# Patient Record
Sex: FEMALE | Race: WHITE | HISPANIC OR LATINO | URBAN - METROPOLITAN AREA
[De-identification: names, ages, dates, MRNs, and addresses within clinical notes are randomized per-mention and may not be internally consistent; named-entity substitution may affect disease eponyms.]

---

## 2021-06-21 ENCOUNTER — OFFICE VISIT (OUTPATIENT)
Dept: URBAN - METROPOLITAN AREA CLINIC 78 | Facility: CLINIC | Age: 36
End: 2021-06-21
Payer: COMMERCIAL

## 2021-06-21 DIAGNOSIS — R12 HEARTBURN: ICD-10-CM

## 2021-06-21 DIAGNOSIS — Z80.0 FAMILY HISTORY OF STOMACH CANCER: ICD-10-CM

## 2021-06-21 DIAGNOSIS — Z86.19 H/O HELICOBACTER INFECTION: ICD-10-CM

## 2021-06-21 DIAGNOSIS — R11.0 NAUSEA: ICD-10-CM

## 2021-06-21 PROCEDURE — 99244 OFF/OP CNSLTJ NEW/EST MOD 40: CPT | Performed by: INTERNAL MEDICINE

## 2021-06-21 RX ORDER — OMEPRAZOLE 40 MG/1
1 CAPSULE 30 MINUTES BEFORE MORNING MEAL CAPSULE, DELAYED RELEASE ORAL ONCE A DAY
Qty: 30 | Refills: 1 | OUTPATIENT
Start: 2021-06-21

## 2021-06-21 NOTE — HPI-TODAY'S VISIT:
The patient was referred to us from [facility] where she sees Dr. Viry Davison. A copy of this note will be sent to the referring physician.   The patient states that ever since she had COVID in January 2021, she had had a lot of new onset GI issues. She describes symtoms of heartburn occurring both during the daytime and nigthtime as well as morning nausea without vomiting. She denies dysphagia.   She was treated for H pylori infection in January of 2021.  She had a follow up H pylori breath test after completing antibiotic therapy and this was negative, as per the patient.  There is no recent history of rectal bleeding. The patient has no pertinent additional complaints of  diarrhea, anorexia or unintentional weight loss.   She has been struggling  with constipation. She has increased the fiber in her diet and does feel that when she eats granola or oatmeal she has a good sensation of evacuation.   She has had some intermittent abdominal pain, described as 7/10 in intensity, worse when she gets bloated.   The patient does not take blood thinners.  She just a tubal ligation reversal and was given some meds for nausea, which she willow used as needed.  The patient has never had either EGD or colonoscopy previously. There is no FH of colon cancer or colon polyps. Her mother was diagnosed with stomach cancer at age 49.

## 2021-07-09 ENCOUNTER — OFFICE VISIT (OUTPATIENT)
Dept: URBAN - METROPOLITAN AREA LAB 1 | Facility: LAB | Age: 36
End: 2021-07-09
Payer: COMMERCIAL

## 2021-07-09 DIAGNOSIS — R11.0 CHRONIC NAUSEA: ICD-10-CM

## 2021-07-09 DIAGNOSIS — K22.8 COLUMNAR-LINED ESOPHAGUS: ICD-10-CM

## 2021-07-09 DIAGNOSIS — K29.60 ADENOPAPILLOMATOSIS GASTRICA: ICD-10-CM

## 2021-07-09 DIAGNOSIS — R12 BURNING REFLUX: ICD-10-CM

## 2021-07-09 PROCEDURE — 43239 EGD BIOPSY SINGLE/MULTIPLE: CPT | Performed by: INTERNAL MEDICINE

## 2021-07-09 RX ORDER — OMEPRAZOLE 40 MG/1
1 CAPSULE 30 MINUTES BEFORE MORNING MEAL CAPSULE, DELAYED RELEASE ORAL ONCE A DAY
Qty: 30 | Refills: 1 | Status: ACTIVE | COMMUNITY
Start: 2021-06-21

## 2021-07-16 ENCOUNTER — ERX REFILL RESPONSE (OUTPATIENT)
Dept: URBAN - METROPOLITAN AREA CLINIC 78 | Facility: CLINIC | Age: 36
End: 2021-07-16

## 2021-07-16 RX ORDER — OMEPRAZOLE 40 MG/1
1 CAPSULE 30 MINUTES BEFORE MORNING MEAL ONCE A DAY ORALLY 30 DAY(S) CAPSULE, DELAYED RELEASE ORAL
Qty: 30 CAPSULE | Refills: 2 | OUTPATIENT

## 2021-07-16 RX ORDER — OMEPRAZOLE 40 MG/1
1 CAPSULE 30 MINUTES BEFORE MORNING MEAL CAPSULE, DELAYED RELEASE ORAL ONCE A DAY
Qty: 30 | Refills: 1 | OUTPATIENT

## 2021-09-17 ENCOUNTER — DASHBOARD ENCOUNTERS (OUTPATIENT)
Age: 36
End: 2021-09-17

## 2021-09-17 ENCOUNTER — OFFICE VISIT (OUTPATIENT)
Dept: URBAN - METROPOLITAN AREA CLINIC 78 | Facility: CLINIC | Age: 36
End: 2021-09-17
Payer: COMMERCIAL

## 2021-09-17 VITALS
WEIGHT: 152.4 LBS | HEART RATE: 66 BPM | TEMPERATURE: 97.6 F | SYSTOLIC BLOOD PRESSURE: 126 MMHG | HEIGHT: 59 IN | BODY MASS INDEX: 30.72 KG/M2 | DIASTOLIC BLOOD PRESSURE: 86 MMHG

## 2021-09-17 DIAGNOSIS — Z80.0 FAMILY HISTORY OF STOMACH CANCER: ICD-10-CM

## 2021-09-17 DIAGNOSIS — R12 HEARTBURN: ICD-10-CM

## 2021-09-17 DIAGNOSIS — K31.89 INTESTINAL METAPLASIA OF GASTRIC MUCOSA: ICD-10-CM

## 2021-09-17 DIAGNOSIS — Z86.19 H/O HELICOBACTER INFECTION: ICD-10-CM

## 2021-09-17 PROCEDURE — 99214 OFFICE O/P EST MOD 30 MIN: CPT | Performed by: INTERNAL MEDICINE

## 2021-09-17 RX ORDER — OMEPRAZOLE 40 MG/1
1 CAPSULE 30 MINUTES BEFORE MORNING MEAL ONCE A DAY ORALLY 30 DAY(S) CAPSULE, DELAYED RELEASE ORAL
Qty: 30 CAPSULE | Refills: 2 | Status: ON HOLD | COMMUNITY

## 2021-09-17 RX ORDER — OMEPRAZOLE 40 MG/1
1 CAPSULE 30 MINUTES BEFORE MORNING MEAL CAPSULE, DELAYED RELEASE ORAL ONCE A DAY
Qty: 30 | Refills: 1 | OUTPATIENT

## 2021-09-17 NOTE — HPI-TODAY'S VISIT:
The patient was referred to us from [facility] where she sees Dr. Viry Davison. A copy of this note will be sent to the referring physician.   The patient states that ever since she had COVID in January 2021, she had had a lot of new onset GI issues. She describes symtoms of heartburn occurring both during the daytime and nigthtime as well as morning nausea without vomiting. She denies dysphagia.   She was treated for H pylori infection in January of 2021.  She had a follow up H pylori breath test after completing antibiotic therapy and this was negative, as per the patient. Today we reviewed the results of her recent EGD/path. Once again H pylori eradication has been confirmed. There was however evidence of GIM.    She has been avoiding spicy and fatty foods and feeling much better. She is not having much int the way of abdominal pain. No further bloating. She quit taking Omeprazole about 2 weeks ago and has not felt any worse.   There is no recent history of rectal bleeding. The patient has no pertinent additional complaints of  diarrhea, anorexia or unintentional weight loss.   She has not been struggling  with constipation anymore. She has increased the fiber in her diet and does feel that when she eats granola or oatmeal she has a good sensation of evacuation. She has been eating a fruit prior to breakfast with great results.  The patient does not take blood thinners.  The patient has never had a colonoscopy previously. There is no FH of colon cancer or colon polyps. Her mother was diagnosed with stomach cancer at age 49.   Summary of prior workup: - EGD by me on 7/9/21: Normal esoph (normal biopsies), GEJ with a regular Z line at 37cm, patchy erythema along the antrum and body (biopsies neg for H pylori but + for intestinal metaplasia), normal duodenum (no celiac sprue).